# Patient Record
Sex: FEMALE | Race: WHITE | URBAN - METROPOLITAN AREA
[De-identification: names, ages, dates, MRNs, and addresses within clinical notes are randomized per-mention and may not be internally consistent; named-entity substitution may affect disease eponyms.]

---

## 2018-04-04 NOTE — PROGRESS NOTES
HPI:   Mario Montalvo is a 44year old female who presents for sinus symptoms for  5  days. She is a new patient to the practice. Moved here from [place]. Patient reports congestion, dry cough, body aches, denies fever, denies sinus pain.  +history of asthm 112/78   Pulse 82   Temp 98.9 °F (37.2 °C) (Oral)   Resp 18   Wt 225 lb   LMP 03/17/2018 (Exact Date)   SpO2 98%   GENERAL: well developed and in no apparent distress  SKIN: warm & dry, no rash  EYES:PERRLA, conjunctiva are clear  HEENT: atraumatic, normoc

## 2018-04-05 ENCOUNTER — OFFICE VISIT (OUTPATIENT)
Dept: FAMILY MEDICINE CLINIC | Facility: CLINIC | Age: 38
End: 2018-04-05

## 2018-04-05 VITALS
OXYGEN SATURATION: 98 % | BODY MASS INDEX: 37.49 KG/M2 | HEART RATE: 82 BPM | WEIGHT: 225 LBS | DIASTOLIC BLOOD PRESSURE: 78 MMHG | HEIGHT: 65 IN | TEMPERATURE: 99 F | RESPIRATION RATE: 18 BRPM | SYSTOLIC BLOOD PRESSURE: 112 MMHG

## 2018-04-05 DIAGNOSIS — J06.9 UPPER RESPIRATORY TRACT INFECTION, UNSPECIFIED TYPE: ICD-10-CM

## 2018-04-05 DIAGNOSIS — E66.9 OBESITY (BMI 35.0-39.9 WITHOUT COMORBIDITY): ICD-10-CM

## 2018-04-05 DIAGNOSIS — J01.00 ACUTE MAXILLARY SINUSITIS, RECURRENCE NOT SPECIFIED: Primary | ICD-10-CM

## 2018-04-05 PROCEDURE — 99203 OFFICE O/P NEW LOW 30 MIN: CPT | Performed by: PHYSICIAN ASSISTANT

## 2018-04-05 RX ORDER — ALBUTEROL SULFATE 90 UG/1
2 AEROSOL, METERED RESPIRATORY (INHALATION) EVERY 6 HOURS PRN
Qty: 1 INHALER | Refills: 0 | COMMUNITY
Start: 2018-04-05

## 2018-04-05 RX ORDER — AZITHROMYCIN 250 MG/1
TABLET, FILM COATED ORAL
Qty: 6 TABLET | Refills: 0 | Status: SHIPPED | OUTPATIENT
Start: 2018-04-05